# Patient Record
Sex: MALE | Race: OTHER | Employment: OTHER | URBAN - METROPOLITAN AREA
[De-identification: names, ages, dates, MRNs, and addresses within clinical notes are randomized per-mention and may not be internally consistent; named-entity substitution may affect disease eponyms.]

---

## 2023-08-31 ENCOUNTER — OFFICE VISIT (OUTPATIENT)
Dept: ORTHOPEDIC SURGERY | Age: 18
End: 2023-08-31

## 2023-08-31 ENCOUNTER — APPOINTMENT (OUTPATIENT)
Dept: GENERAL RADIOLOGY | Age: 18
End: 2023-08-31

## 2023-08-31 ENCOUNTER — HOSPITAL ENCOUNTER (EMERGENCY)
Age: 18
Discharge: HOME | End: 2023-08-31
Attending: EMERGENCY MEDICINE

## 2023-08-31 VITALS — WEIGHT: 169 LBS | BODY MASS INDEX: 27.16 KG/M2 | HEIGHT: 66 IN | RESPIRATION RATE: 16 BRPM

## 2023-08-31 VITALS
OXYGEN SATURATION: 98 % | TEMPERATURE: 97.6 F | BODY MASS INDEX: 26.62 KG/M2 | SYSTOLIC BLOOD PRESSURE: 120 MMHG | DIASTOLIC BLOOD PRESSURE: 73 MMHG | WEIGHT: 169.6 LBS | HEART RATE: 97 BPM | RESPIRATION RATE: 16 BRPM | HEIGHT: 67 IN

## 2023-08-31 DIAGNOSIS — S62.646A CLOSED NONDISPLACED FRACTURE OF PROXIMAL PHALANX OF RIGHT LITTLE FINGER, INITIAL ENCOUNTER: Primary | ICD-10-CM
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
## 2023-08-31 DIAGNOSIS — S62.616A CLOSED DISPLACED FRACTURE OF PROXIMAL PHALANX OF RIGHT LITTLE FINGER, INITIAL ENCOUNTER: Primary | ICD-10-CM

## 2023-08-31 PROCEDURE — 6360000002 HC RX W HCPCS: Performed by: EMERGENCY MEDICINE

## 2023-08-31 PROCEDURE — 2500000003 HC RX 250 WO HCPCS: Performed by: EMERGENCY MEDICINE

## 2023-08-31 PROCEDURE — 6370000000 HC RX 637 (ALT 250 FOR IP): Performed by: EMERGENCY MEDICINE

## 2023-08-31 PROCEDURE — 90714 TD VACC NO PRESV 7 YRS+ IM: CPT | Performed by: EMERGENCY MEDICINE

## 2023-08-31 PROCEDURE — 99284 EMERGENCY DEPT VISIT MOD MDM: CPT

## 2023-08-31 PROCEDURE — 90471 IMMUNIZATION ADMIN: CPT | Performed by: EMERGENCY MEDICINE

## 2023-08-31 PROCEDURE — 73130 X-RAY EXAM OF HAND: CPT

## 2023-08-31 PROCEDURE — 29125 APPL SHORT ARM SPLINT STATIC: CPT

## 2023-08-31 PROCEDURE — 99204 OFFICE O/P NEW MOD 45 MIN: CPT | Performed by: ORTHOPAEDIC SURGERY

## 2023-08-31 RX ORDER — LIDOCAINE HYDROCHLORIDE 10 MG/ML
5 INJECTION, SOLUTION EPIDURAL; INFILTRATION; INTRACAUDAL; PERINEURAL ONCE
Status: COMPLETED | OUTPATIENT
Start: 2023-08-31 | End: 2023-08-31

## 2023-08-31 RX ORDER — ACETAMINOPHEN 500 MG
1000 TABLET ORAL ONCE
Status: COMPLETED | OUTPATIENT
Start: 2023-08-31 | End: 2023-08-31

## 2023-08-31 RX ORDER — NAPROXEN 500 MG/1
500 TABLET ORAL 2 TIMES DAILY WITH MEALS
Qty: 20 TABLET | Refills: 0 | Status: SHIPPED | OUTPATIENT
Start: 2023-08-31 | End: 2023-09-01 | Stop reason: ALTCHOICE

## 2023-08-31 RX ORDER — IBUPROFEN 600 MG/1
600 TABLET ORAL ONCE
Status: COMPLETED | OUTPATIENT
Start: 2023-08-31 | End: 2023-08-31

## 2023-08-31 RX ORDER — HYDROCODONE BITARTRATE AND ACETAMINOPHEN 5; 325 MG/1; MG/1
1 TABLET ORAL EVERY 4 HOURS PRN
Qty: 8 TABLET | Refills: 0 | Status: SHIPPED | OUTPATIENT
Start: 2023-08-31 | End: 2023-09-03

## 2023-08-31 RX ADMIN — ACETAMINOPHEN 1000 MG: 500 TABLET ORAL at 07:39

## 2023-08-31 RX ADMIN — CLOSTRIDIUM TETANI TOXOID ANTIGEN (FORMALDEHYDE INACTIVATED) AND CORYNEBACTERIUM DIPHTHERIAE TOXOID ANTIGEN (FORMALDEHYDE INACTIVATED) 0.5 ML: 5; 2 INJECTION, SUSPENSION INTRAMUSCULAR at 07:39

## 2023-08-31 RX ADMIN — LIDOCAINE HYDROCHLORIDE 5 ML: 10 INJECTION, SOLUTION EPIDURAL; INFILTRATION; INTRACAUDAL; PERINEURAL at 09:11

## 2023-08-31 RX ADMIN — IBUPROFEN 600 MG: 600 TABLET ORAL at 07:39

## 2023-08-31 ASSESSMENT — PAIN - FUNCTIONAL ASSESSMENT: PAIN_FUNCTIONAL_ASSESSMENT: 0-10

## 2023-08-31 ASSESSMENT — LIFESTYLE VARIABLES
HOW MANY STANDARD DRINKS CONTAINING ALCOHOL DO YOU HAVE ON A TYPICAL DAY: PATIENT DOES NOT DRINK
HOW OFTEN DO YOU HAVE A DRINK CONTAINING ALCOHOL: NEVER

## 2023-08-31 ASSESSMENT — PAIN DESCRIPTION - LOCATION: LOCATION: HAND

## 2023-08-31 ASSESSMENT — PAIN SCALES - GENERAL
PAINLEVEL_OUTOF10: 8
PAINLEVEL_OUTOF10: 6

## 2023-08-31 ASSESSMENT — PAIN DESCRIPTION - ORIENTATION: ORIENTATION: RIGHT
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
## 2023-09-01 ENCOUNTER — TELEPHONE (OUTPATIENT)
Dept: ORTHOPEDIC SURGERY | Age: 18
End: 2023-09-01

## 2023-09-01 NOTE — TELEPHONE ENCOUNTER
Auth: NPR  Date: 09/07/23  Reference # None  Spoke with: None  Type of SX: Outpatient  Location: [facility]  CPT: [id number]   DX: J35.995F  SX area: Rt hand  Insurance: Self-Pay

## 2023-09-07 ENCOUNTER — TELEPHONE (OUTPATIENT)
Dept: ORTHOPEDIC SURGERY | Age: 18
End: 2023-09-07

## 2023-09-08 ENCOUNTER — ANESTHESIA EVENT (OUTPATIENT)
Dept: OPERATING ROOM | Age: 18
End: 2023-09-08

## 2023-09-08 NOTE — CONSULTS
surgeon if you experience dizziness, shortness         of breath or blurred vision between now and the time of your surgery. Do not shave your operative site 96 hours prior to surgery. For face and neck surgery, men may use an electric razor 48 hours   prior to surgery. You may shower the night before surgery or the morning of   your surgery with an antibacterial soap. You will need to bring a photo ID and insurance card     If you use a C-pap or Bi-pap machine, please bring your machine with you to the hospital     Our goal is to provide you with excellent care, therefore, visitors will be limited to so that we may focus on providing this care for you. Please contact your surgeon office, if you have any further questions. [facility] phone number:  [phone] Bettie Spann [facility] fax number:  [phone]    Please note these are generalized instructions for all surgical cases, you may be provided with more specific instructions according to your surgery.

## 2023-09-08 NOTE — PROGRESS NOTES
Follow Up Prior to Surgery    DOS: 23  :2005    Serena Geller:                                      Surgeon's Name:Iron Almanza cancelled him, yesterday due to no   Per Adali Hernandez she has started the process for  for DOS. As of right now- not confirmed. Lilton Sheets will keep us posted.

## 2023-09-11 ENCOUNTER — APPOINTMENT (OUTPATIENT)
Dept: GENERAL RADIOLOGY | Age: 18
End: 2023-09-11
Attending: ORTHOPAEDIC SURGERY

## 2023-09-11 ENCOUNTER — HOSPITAL ENCOUNTER (OUTPATIENT)
Age: 18
Setting detail: OUTPATIENT SURGERY
Discharge: HOME | End: 2023-09-11
Attending: ORTHOPAEDIC SURGERY | Admitting: ORTHOPAEDIC SURGERY

## 2023-09-11 ENCOUNTER — ANESTHESIA (OUTPATIENT)
Dept: OPERATING ROOM | Age: 18
End: 2023-09-11

## 2023-09-11 VITALS
HEIGHT: 66 IN | OXYGEN SATURATION: 100 % | HEART RATE: 67 BPM | SYSTOLIC BLOOD PRESSURE: 125 MMHG | DIASTOLIC BLOOD PRESSURE: 86 MMHG | RESPIRATION RATE: 16 BRPM | WEIGHT: 168.76 LBS | TEMPERATURE: 96.9 F | BODY MASS INDEX: 27.12 KG/M2

## 2023-09-11 DIAGNOSIS — S62.646A CLOSED NONDISPLACED FRACTURE OF PROXIMAL PHALANX OF RIGHT LITTLE FINGER, INITIAL ENCOUNTER: Primary | ICD-10-CM

## 2023-09-11 PROCEDURE — 73140 X-RAY EXAM OF FINGER(S): CPT

## 2023-09-11 PROCEDURE — 7100000010 HC PHASE II RECOVERY - FIRST 15 MIN: Performed by: ORTHOPAEDIC SURGERY

## 2023-09-11 PROCEDURE — 2709999900 HC NON-CHARGEABLE SUPPLY: Performed by: ORTHOPAEDIC SURGERY

## 2023-09-11 PROCEDURE — 7100000001 HC PACU RECOVERY - ADDTL 15 MIN: Performed by: ORTHOPAEDIC SURGERY

## 2023-09-11 PROCEDURE — 6360000002 HC RX W HCPCS: Performed by: ORTHOPAEDIC SURGERY

## 2023-09-11 PROCEDURE — 3700000000 HC ANESTHESIA ATTENDED CARE: Performed by: ORTHOPAEDIC SURGERY

## 2023-09-11 PROCEDURE — A4217 STERILE WATER/SALINE, 500 ML: HCPCS | Performed by: ORTHOPAEDIC SURGERY

## 2023-09-11 PROCEDURE — 7100000011 HC PHASE II RECOVERY - ADDTL 15 MIN: Performed by: ORTHOPAEDIC SURGERY

## 2023-09-11 PROCEDURE — 6360000002 HC RX W HCPCS: Performed by: ANESTHESIOLOGY

## 2023-09-11 PROCEDURE — 7100000000 HC PACU RECOVERY - FIRST 15 MIN: Performed by: ORTHOPAEDIC SURGERY

## 2023-09-11 PROCEDURE — 3600000014 HC SURGERY LEVEL 4 ADDTL 15MIN: Performed by: ORTHOPAEDIC SURGERY

## 2023-09-11 PROCEDURE — 2580000003 HC RX 258: Performed by: ORTHOPAEDIC SURGERY

## 2023-09-11 PROCEDURE — 6360000002 HC RX W HCPCS: Performed by: NURSE ANESTHETIST, CERTIFIED REGISTERED

## 2023-09-11 PROCEDURE — 2500000003 HC RX 250 WO HCPCS: Performed by: NURSE ANESTHETIST, CERTIFIED REGISTERED

## 2023-09-11 PROCEDURE — 3700000001 HC ADD 15 MINUTES (ANESTHESIA): Performed by: ORTHOPAEDIC SURGERY

## 2023-09-11 PROCEDURE — 2580000003 HC RX 258: Performed by: NURSE ANESTHETIST, CERTIFIED REGISTERED

## 2023-09-11 PROCEDURE — 3600000004 HC SURGERY LEVEL 4 BASE: Performed by: ORTHOPAEDIC SURGERY

## 2023-09-11 PROCEDURE — 6370000000 HC RX 637 (ALT 250 FOR IP): Performed by: ANESTHESIOLOGY

## 2023-09-11 DEVICE — K WIRE FIX L229MM DIA1.1MM STYL 6 S STL DBL DMND PNT: Type: IMPLANTABLE DEVICE | Site: FINGERS | Status: FUNCTIONAL

## 2023-09-11 RX ORDER — ONDANSETRON 2 MG/ML
4 INJECTION INTRAMUSCULAR; INTRAVENOUS
Status: DISCONTINUED | OUTPATIENT
Start: 2023-09-11 | End: 2023-09-11 | Stop reason: HOSPADM

## 2023-09-11 RX ORDER — DEXAMETHASONE SODIUM PHOSPHATE 4 MG/ML
INJECTION, SOLUTION INTRA-ARTICULAR; INTRALESIONAL; INTRAMUSCULAR; INTRAVENOUS; SOFT TISSUE PRN
Status: DISCONTINUED | OUTPATIENT
Start: 2023-09-11 | End: 2023-09-11 | Stop reason: SDUPTHER

## 2023-09-11 RX ORDER — BUPIVACAINE HYDROCHLORIDE 5 MG/ML
INJECTION, SOLUTION EPIDURAL; INTRACAUDAL
Status: COMPLETED | OUTPATIENT
Start: 2023-09-11 | End: 2023-09-11

## 2023-09-11 RX ORDER — LIDOCAINE HYDROCHLORIDE 20 MG/ML
INJECTION, SOLUTION EPIDURAL; INFILTRATION; INTRACAUDAL; PERINEURAL PRN
Status: DISCONTINUED | OUTPATIENT
Start: 2023-09-11 | End: 2023-09-11 | Stop reason: SDUPTHER

## 2023-09-11 RX ORDER — ONDANSETRON 2 MG/ML
INJECTION INTRAMUSCULAR; INTRAVENOUS PRN
Status: DISCONTINUED | OUTPATIENT
Start: 2023-09-11 | End: 2023-09-11 | Stop reason: SDUPTHER

## 2023-09-11 RX ORDER — SODIUM CHLORIDE 9 MG/ML
INJECTION, SOLUTION INTRAVENOUS PRN
Status: DISCONTINUED | OUTPATIENT
Start: 2023-09-11 | End: 2023-09-11 | Stop reason: HOSPADM

## 2023-09-11 RX ORDER — PROPOFOL 10 MG/ML
INJECTION, EMULSION INTRAVENOUS PRN
Status: DISCONTINUED | OUTPATIENT
Start: 2023-09-11 | End: 2023-09-11 | Stop reason: SDUPTHER

## 2023-09-11 RX ORDER — FENTANYL CITRATE 50 UG/ML
INJECTION, SOLUTION INTRAMUSCULAR; INTRAVENOUS PRN
Status: DISCONTINUED | OUTPATIENT
Start: 2023-09-11 | End: 2023-09-11 | Stop reason: SDUPTHER

## 2023-09-11 RX ORDER — HYDROCODONE BITARTRATE AND ACETAMINOPHEN 5; 325 MG/1; MG/1
1 TABLET ORAL EVERY 6 HOURS PRN
Qty: 20 TABLET | Refills: 0 | Status: SHIPPED | OUTPATIENT
Start: 2023-09-11 | End: 2023-09-18

## 2023-09-11 RX ORDER — SODIUM CHLORIDE 0.9 % (FLUSH) 0.9 %
5-40 SYRINGE (ML) INJECTION EVERY 12 HOURS SCHEDULED
Status: DISCONTINUED | OUTPATIENT
Start: 2023-09-11 | End: 2023-09-11 | Stop reason: HOSPADM

## 2023-09-11 RX ORDER — MAGNESIUM HYDROXIDE 1200 MG/15ML
LIQUID ORAL CONTINUOUS PRN
Status: DISCONTINUED | OUTPATIENT
Start: 2023-09-11 | End: 2023-09-11 | Stop reason: HOSPADM

## 2023-09-11 RX ORDER — FENTANYL CITRATE 0.05 MG/ML
25 INJECTION, SOLUTION INTRAMUSCULAR; INTRAVENOUS EVERY 5 MIN PRN
Status: DISCONTINUED | OUTPATIENT
Start: 2023-09-11 | End: 2023-09-11 | Stop reason: HOSPADM

## 2023-09-11 RX ORDER — SODIUM CHLORIDE 0.9 % (FLUSH) 0.9 %
5-40 SYRINGE (ML) INJECTION PRN
Status: DISCONTINUED | OUTPATIENT
Start: 2023-09-11 | End: 2023-09-11 | Stop reason: HOSPADM

## 2023-09-11 RX ORDER — SODIUM CHLORIDE 9 MG/ML
INJECTION, SOLUTION INTRAVENOUS CONTINUOUS PRN
Status: DISCONTINUED | OUTPATIENT
Start: 2023-09-11 | End: 2023-09-11 | Stop reason: SDUPTHER

## 2023-09-11 RX ORDER — OXYCODONE HYDROCHLORIDE 5 MG/1
5 TABLET ORAL
Status: COMPLETED | OUTPATIENT
Start: 2023-09-11 | End: 2023-09-11

## 2023-09-11 RX ADMIN — PROPOFOL 200 MG: 10 INJECTION, EMULSION INTRAVENOUS at 16:58

## 2023-09-11 RX ADMIN — LIDOCAINE HYDROCHLORIDE 80 MG: 20 INJECTION, SOLUTION EPIDURAL; INFILTRATION; INTRACAUDAL; PERINEURAL at 16:58

## 2023-09-11 RX ADMIN — CEFAZOLIN 2000 MG: 2 INJECTION, POWDER, FOR SOLUTION INTRAMUSCULAR; INTRAVENOUS at 17:01

## 2023-09-11 RX ADMIN — FENTANYL CITRATE 100 MCG: 50 INJECTION INTRAMUSCULAR; INTRAVENOUS at 16:58

## 2023-09-11 RX ADMIN — HYDROMORPHONE HYDROCHLORIDE 0.5 MG: 1 INJECTION, SOLUTION INTRAMUSCULAR; INTRAVENOUS; SUBCUTANEOUS at 17:54

## 2023-09-11 RX ADMIN — DEXAMETHASONE SODIUM PHOSPHATE 8 MG: 4 INJECTION, SOLUTION INTRAMUSCULAR; INTRAVENOUS at 17:01

## 2023-09-11 RX ADMIN — ONDANSETRON 4 MG: 2 INJECTION INTRAMUSCULAR; INTRAVENOUS at 17:01

## 2023-09-11 RX ADMIN — OXYCODONE 5 MG: 5 TABLET ORAL at 18:10

## 2023-09-11 RX ADMIN — SODIUM CHLORIDE: 9 INJECTION, SOLUTION INTRAVENOUS at 16:55

## 2023-09-11 ASSESSMENT — PAIN DESCRIPTION - DESCRIPTORS: DESCRIPTORS: ACHING

## 2023-09-11 ASSESSMENT — PAIN - FUNCTIONAL ASSESSMENT: PAIN_FUNCTIONAL_ASSESSMENT: 0-10

## 2023-09-11 ASSESSMENT — PAIN DESCRIPTION - ORIENTATION: ORIENTATION: RIGHT

## 2023-09-11 ASSESSMENT — PAIN SCALES - GENERAL
PAINLEVEL_OUTOF10: 5
PAINLEVEL_OUTOF10: 5
PAINLEVEL_OUTOF10: 7

## 2023-09-11 ASSESSMENT — PAIN DESCRIPTION - PAIN TYPE: TYPE: SURGICAL PAIN

## 2023-09-11 ASSESSMENT — LIFESTYLE VARIABLES: SMOKING_STATUS: 1

## 2023-09-11 ASSESSMENT — PAIN DESCRIPTION - LOCATION: LOCATION: HAND

## 2023-09-11 ASSESSMENT — ENCOUNTER SYMPTOMS: SHORTNESS OF BREATH: 0

## 2023-09-11 NOTE — PROGRESS NOTES
Patient admitted to PACU # 8 from OR at 1722 post closed reduction and percutaneous pining of right small finger proximal phalanx fracture per Dr Jerrod Perry. Attached to PACU monitoring system and report received from anesthesia provider. Patient was reported to be hemodynamically stable during procedure.   Patient resting quietly with eyes closed upon arrival.

## 2023-09-11 NOTE — OP NOTE
OPERATIVE REPORT          Patient:  Keyla Glass    YOB: 2005  Date of Service:  9/11/2023    Location:  [address] [place] [address] OR    Preoperative Diagnosis:  Right Small Finger Proximal Phalanx Base unstable fracture      Postoperative Diagnosis:  Same      Procedure:  Closed reduction and percutaneous pinning of Right Small Finger Proximal Phalanx Base fracture    Surgeon:    Gloria Ramirez MD    Surgical Assistant:    RICKIE Marcano Assistant    Anesthesia:  General         Blood Loss: Minimal    Complications: None                                                           Indications:  Mr. Keyla Glass  is a 19y.o. year old male with a Right Small Finger Proximal Phalanx Base fracture which is unstable. I have discussed preoperatively with him  the complications, limitations, expectations, alternatives, and risks of surgical care. Mr. Keyla Glass has provided written informed consent to proceed. Procedure: After written consent was obtained and the proper site was identified and marked, Mr. Keyla Glass  was brought to the operating room and placed in the supine position on the operating room table. The Right arm was extended upon a hand table. A dose of preoperative antibiotics was administered and the Right upper extremity was prepped and draped in the usual sterile fashion. Under fluoroscopic guidance, a closed reduction of the Proximal Phalanx fracture was performed. two 0.045\" K-wires were percutaneously placed securing the fracture fragments in anatomic alignment, assuring that there was no distraction or malrotation. Final fluoroscopic images demonstrated anatomic alignment and appropriate reduction of the fracture fragments. The K-wires were checked for appropriate position and dimension. The wire ends were bent, cut short, & protective caps were applied. Local anesthestic was instilled for post-operative analgesia.

## 2023-09-11 NOTE — ANESTHESIA POSTPROCEDURE EVALUATION
Department of Anesthesiology  Postprocedure Note    Patient: Neli Honeycutt  MRN: [id number]  YOB: 2005  Date of evaluation: 9/11/2023      Procedure Summary     Date: 09/11/23 Room / Location: [address] [place] [address]    Anesthesia Start: 1657 Anesthesia Stop: 1726    Procedure: CLOSED REDUCTION AND PERCUTANEOUS PINNING OF RIGHT SMALL FINGER PROXIMAL PHALANX FRACTURE (Right) Diagnosis:       Closed nondisplaced fracture of proximal phalanx of right little finger, initial encounter      (Closed nondisplaced fracture of proximal phalanx of right little finger, initial encounter [O96.791I])    Surgeons: Umer Short MD Responsible Provider: Ketan Lopez MD    Anesthesia Type: General ASA Status: 2          Anesthesia Type: General    Skylar Phase I: Skylar Score: 10    Skylar Phase II: Skylar Score: 10      Anesthesia Post Evaluation    Patient location during evaluation: PACU  Patient participation: complete - patient participated  Level of consciousness: awake and alert  Airway patency: patent  Nausea & Vomiting: no nausea and no vomiting  Complications: no  Cardiovascular status: hemodynamically stable  Respiratory status: acceptable  Hydration status: stable  Pain management: adequate

## 2023-09-11 NOTE — DISCHARGE INSTRUCTIONS
Post-Operative Instructions    Hand & Finger Fracture Repair:    Keep hand strictly elevated with fingers above eye-level to control swelling and pain. NOTE: If hand is allowed to swell, pain will be very difficult to control. Keep hand and bandage clean and dry. Do not change or unwrap bandage. Please leave bandage in place until your follow-up appointment. Maintain finger motion by fully straightening and fully bending the exposed fingers at least once an hour (while awake). This may cause some discomfort, but will not damage surgery. You should not use your operated hand for any tasks. NO LIFTING, CARRYING OR HEAVY USE. You may take the prescribed pain medication as needed  OR   You may take over the counter medication (Tylenol, Advil, Aleve, etc.) but you should not take both together unless otherwise instructed. Please call the office at [phone]  in 24 - 48 hours to schedule a follow up appointment for 1 - 2 weeks after surgery. Please call the office at [phone]  if you have any questions or problems. Marilyn Green MD

## 2023-09-11 NOTE — H&P
Pre-operative Update of H&P:    I  have seen & examined Mr. Morris Moore related solely to his hand and upper extremity conditions, prior to the scheduled procedure on the date of his surgery. The indications for the planned surgical procedure & and his upper-extremity condition are unchanged. As Mr. Morris Moore  does not speak english as his primary language, the entire visit today was conducted through a professional .

## 2023-09-11 NOTE — PROGRESS NOTES
Phase II care completed in PACU. Patient tolerating drink, snack, and given PO pain medication. Family member called by Eugenia Espinoza RN to provide transport. Patient dressing self at this time.  has been present for entirety of phase I/phase II care.

## 2023-09-21 ENCOUNTER — TELEPHONE (OUTPATIENT)
Dept: ORTHOPEDIC SURGERY | Age: 18
End: 2023-09-21

## 2023-09-21 NOTE — TELEPHONE ENCOUNTER
General Question     Subject: MEDICATION  Patient and /or Facility Request: Madi Brooks Number: [id number]    PT COUSIN YIMI SAID PT NEEDS SCRIPT SENT TO [address] [place] [address] ON [address] [place] [address] [place]    PT DOESN'T SPEAK ENGLISH WELL  QUESTIONS YOSI ALARCON [phone]

## 2023-09-25 ENCOUNTER — OFFICE VISIT (OUTPATIENT)
Dept: ORTHOPEDIC SURGERY | Age: 18
End: 2023-09-25

## 2023-09-25 VITALS — HEIGHT: 66 IN | WEIGHT: 168 LBS | BODY MASS INDEX: 27 KG/M2 | RESPIRATION RATE: 16 BRPM

## 2023-09-25 DIAGNOSIS — S62.646A CLOSED NONDISPLACED FRACTURE OF PROXIMAL PHALANX OF RIGHT LITTLE FINGER, INITIAL ENCOUNTER: Primary | ICD-10-CM

## 2023-09-25 PROCEDURE — 29075 APPL CST ELBW FNGR SHORT ARM: CPT | Performed by: PHYSICIAN ASSISTANT

## 2023-09-25 PROCEDURE — 99024 POSTOP FOLLOW-UP VISIT: CPT | Performed by: PHYSICIAN ASSISTANT

## 2023-09-25 NOTE — PROGRESS NOTES
Mr. Raz Cortes returns today in follow-up of his recent right Small Finger Proximal Phalanx Fracture Repair which was done 2 week ago. He has noted decreased discomfort and decreased swelling. He notes no symptoms of numbness, tingling, no symptoms related to perfusion. Physical Exam:  Skin incisions & pin sites are healing well, without erythema, drainage or sign of infection. Digital range of motion is not assessed due to the presence of the un-healed fracture. Wrist range of motion is mildly stiff from immobilization. Sensation is normal in the Whole Hand. Vascular examination reveals normal, good capillary refill, and good color. Swelling is minimal.  Clinical alignment and rotation are normal.      Radiographic Evaluation:  Radiographs were obtained today (2 views of the right  Small Finger). They demonstrate good maintenance of alignment of the fracture fragments, no rotational deformity, & mild amount of interval healing of the fracture. There has not been evidence of hardware loosening or failure. The fracture does not appear to be healed at this time. Impression:  Mr. Raz Cortes is doing well after recent right Small Finger  Distal Phalanx  Fracture Repair. It would appear that he has not fully healed his fracture. Plan:  Mr. Raz Cortes is today returned to an appropriately applied well padded cast incorporating the necessary digits in an intrinsic-safe position. He is advised regarding the appropriate care of, wear, and use of this device. He is also instructed in  work on Active & Passive range of motion of the exposed digits & elbow. These modalities were demonstrated to him today. We discussed the continued restrictions on the use of the injured hand & wrist and the limitations on resumption of activities. We discussed the option of pursuing formalized hand therapy and a prescription  was not indicated.     I have asked

## 2023-10-09 ENCOUNTER — OFFICE VISIT (OUTPATIENT)
Dept: ORTHOPEDIC SURGERY | Age: 18
End: 2023-10-09

## 2023-10-09 VITALS — BODY MASS INDEX: 27 KG/M2 | WEIGHT: 168 LBS | HEIGHT: 66 IN | RESPIRATION RATE: 16 BRPM

## 2023-10-09 DIAGNOSIS — S62.646A CLOSED NONDISPLACED FRACTURE OF PROXIMAL PHALANX OF RIGHT LITTLE FINGER, INITIAL ENCOUNTER: Primary | ICD-10-CM

## 2023-10-09 PROCEDURE — 99024 POSTOP FOLLOW-UP VISIT: CPT | Performed by: PHYSICIAN ASSISTANT

## 2023-10-09 NOTE — PATIENT INSTRUCTIONS
Hand Range of Motion Instructions      Dr. Divina Wright    Be cautions in resuming fulll activities and use of the hand for next 2 - 4 weeks. Perform the following exercises VIGOROUSLY at least four times a day. Exercises should be performed in the seated position with elbow on tabletop or other firm surface. If you cannot make these motions on your own, you may use other hand to assist in making these motions. Fully straighten fingers until hand is flat. Fully bend fingers until hand is in a full fist.   Bend wrist forward and backward (grasp hand around knuckles with other hand to do so). Rotate forearm so that your palm faces towards your face. Rotate forearm so that your palm faces away from your face (grasp hand around wrist with other hand to do so). Fully straighten elbow. Fully bend elbow. Continue light use of the hand progressing to more normal us as it feels comfortable to do so. In 2 - 4 weeks you may discontinue using the brace (if you were using one) and resume normal use of the hand and wrist if you have regained full and painless motion and function. If you are unable to achieve  full and painless motion and function over 4 weeks, please call the office at [phone] to schedule a follow-up appointment with Dr. Todd Thorpe. Thank you for choosing [facility] for your Hand and Upper Extremity needs. If we can be of any further assistance to you, please do not hesitate to contact us.     Office Phone Number:  [phone]  or  [phone]

## 2023-10-09 NOTE — PROGRESS NOTES
Mr. Keyla Glass returns today in follow-up of his recent right Small Finger Proximal Phalanx Fracture Repair which was done 4 weeks ago. He has noted decreased discomfort and decreased swelling. Due to language barrier, an  was present during the history-taking and subsequent discussion (and for part of the physical exam) with this patient. He notes no symptoms of numbness, tingling, no symptoms related to perfusion. Physical Exam:  Skin incisions & pin sites are healing well, without erythema, drainage or sign of infection. Digital range of motion is not assessed due to the presence of the recent fracture. Wrist range of motion is somewhat stiff from immobilization. Sensation is normal in the Whole Hand. Vascular examination reveals normal, good capillary refill, and good color. Swelling is minimal.  Clinical alignment and rotation are normal.  Fracture site shows mild tenderness to palpation. Radiographic Evaluation:  Radiographs were obtained today (2 views of the right Small Finger). They demonstrate good maintenance of alignment of the fracture fragments, no rotational deformity, & moderate amount of interval healing of the fracture. There has not been evidence of hardware loosening or failure. The fracture does appear to be healed at this time. Impression:  Mr. Keyla Glass is doing well after recent right Small Finger Proximal Phalanx Fracture Repair. It would appear that he has adequately healed his fracture to begin mobilization. Plan:  Mr. Keyla Glass has healed his fracture at this time. His Pecutaneous Pins were removed today without difficulty. With his consent, the area surrounding the pins was sterily prepped and the pins were carefully removed. There was minimal bleeding which was easily controlled. A dry sterile dressing was applied, He tolerated the procedure without difficulty.   Mr. Keyla Glass

## (undated) DEVICE — PADDING,UNDERCAST,COTTON, 4"X4YD STERILE: Brand: MEDLINE

## (undated) DEVICE — GLOVE SURG SZ 65 CRM LTX FREE POLYISOPRENE POLYMER BEAD ANTI

## (undated) DEVICE — SOLUTION IV IRRIG 250ML ST LF 0.9% SODIUM 2F7122

## (undated) DEVICE — BANDAGE COMPR W4INXL15FT BGE E SGL LAYERED CLP CLSR

## (undated) DEVICE — C-ARM PACK: Brand: C-ARM COVER

## (undated) DEVICE — WILLIS PACK: Brand: MEDLINE INDUSTRIES, INC.

## (undated) DEVICE — GOWN SIRUS NONREIN XL W/TWL: Brand: MEDLINE INDUSTRIES, INC.

## (undated) DEVICE — SPLINT ORTH W4XL15IN LAYERED FBRGLS FOAM PD BRTH BK MOLD

## (undated) DEVICE — SHEET,DRAPE,53X77,STERILE: Brand: MEDLINE

## (undated) DEVICE — GLOVE SURG SZ 75 CRM LTX FREE POLYISOPRENE POLYMER BEAD ANTI

## (undated) DEVICE — GLOVE SURG SZ 65 L12IN FNGR THK79MIL GRN LTX FREE